# Patient Record
Sex: MALE | Employment: STUDENT | URBAN - METROPOLITAN AREA
[De-identification: names, ages, dates, MRNs, and addresses within clinical notes are randomized per-mention and may not be internally consistent; named-entity substitution may affect disease eponyms.]

---

## 2023-04-21 ENCOUNTER — TELEPHONE (OUTPATIENT)
Dept: PULMONOLOGY | Facility: CLINIC | Age: 28
End: 2023-04-21
Payer: COMMERCIAL

## 2023-04-21 NOTE — TELEPHONE ENCOUNTER
At the request of Mil's partner Debra's CF care team I contacted Debra to discuss her desire to pursue genetic counseling and CF carrier screening for Mil.  We briefly reviewed the process for this and a vitual appointment was scheduled at the couple's convenience.  The call center telephone number was shared for Mil to call to update his registration prior to the visit.  I remain available for additional questions or concerns in the meantime.       Nikky Das MS, [facility] [place]  Genetic Counselor  [facility]  [facility]

## 2023-04-26 ENCOUNTER — VIRTUAL VISIT (OUTPATIENT)
Dept: PULMONOLOGY | Facility: CLINIC | Age: 28
End: 2023-04-26
Attending: GENETIC COUNSELOR, MS
Payer: COMMERCIAL

## 2023-04-26 VITALS — HEIGHT: 71 IN | WEIGHT: 175 LBS | BODY MASS INDEX: 24.5 KG/M2

## 2023-04-26 DIAGNOSIS — Z83.49 FAMILY HISTORY OF CYSTIC FIBROSIS: ICD-10-CM

## 2023-04-26 DIAGNOSIS — Z13.71 SCREENING FOR GENETIC DISEASE CARRIER STATUS: ICD-10-CM

## 2023-04-26 DIAGNOSIS — Z82.79 FAMILY HISTORY OF CONGENITAL OR GENETIC CONDITION: Primary | ICD-10-CM

## 2023-04-26 PROCEDURE — 96040 HC GENETIC COUNSELING, EACH 30 MINUTES: CPT | Mod: GT,95 | Performed by: GENETIC COUNSELOR, MS

## 2023-04-26 ASSESSMENT — PAIN SCALES - GENERAL: PAINLEVEL: NO PAIN (0)

## 2023-04-26 NOTE — LETTER
4/26/2023      RE: Mil Llanes  [address]  [place] [address]     Dear Colleague,    Thank you for the opportunity to participate in the care of your patient, Mil Llanes, at the [facility] at [facility]. Please see a copy of my visit note below.    Virtual Visit Details    Type of service:  Video Visit     Originating Location (pt. Location): Home  Distant Location (provider location): Off-site  Platform used for Video Visit: Likelii    Video Visit Start Time: 1:52pm  Video Visit End Time:  2:14pm      Presenting Information:  Mil Llanes is a 27-year-old man whose partner Debra has a diagnosis of cystic fibrosis.  Mil was seen today for genetic counseling and carrier screening.  During today's visit a family history was collected, the genetics and inheritance of cystic fibrosis were reviewed, and the options available for carrier screening were discussed.  At the conclusion of our visit Mil opted to proceed with cystic fibrosis carrier screening only and informed consent was obtained.      Family History:  A detailed pedigree was obtained and scanned into the electronic medical record.  It is significant for the following:     Mil has no children yet by choice.      Mil has a brother and a sister, both of whom are healthy.      Mil's brother has two healthy daughters.      Mil's mother is 60-years-old and healthy.     Mil's father is 61-years-old.  He has a history of obesity.      Mil has no known family history of cystic fibrosis, lung disease, pancreatitis, or male infertility.  He additionally reports no history of individuals with developmental delay/intellectual disability, birth defects, other significant health problems, or known genetic conditions.      Mil's partner Debra is 26-years-old.  She has cystic fibrosis.      Mil is of Pashto, [place]ic, and  [place] ancestry.  His partner Debra is of Turkish and other mixed  ancestry.  Consanguinity was denied.      Discussion:  Today we reviewed that genes are long stretches of DNA that are responsible for how our bodies look and how our bodies work.  We all have two copies of every gene; one inherited from the mother and one inherited from the father. When there is a change, called a mutation, in a gene it can cause it to not do its job correctly which can cause the signs and symptoms of a genetic condition.  Cystic fibrosis (CF) is caused by mutations in a gene called CFTR.       Cystic fibrosis is inherited in an autosomal recessive pattern. This means that to be affected an individual must inherit a mutation in both copies of the CFTR gene (one from each parent). Individuals with just one mutation in the CFTR gene are said to be carriers. Carriers do not have cystic fibrosis but can have an affected child if their partner is also a carrier.      Because Mil's partner Debra has cystic fibrosis, she has a mutation on both copies of her CFTR gene.  Therefore no matter which copy of her CFTR gene she passes down to a child, a child will inherit a mutation and at least be a carrier for cystic fibrosis.  A child could actually have cystic fibrosis if Mil is a carrier.  If he is, any child will have a 50% chance to have cystic fibrosis.  If Mil is not found to be a carrier the chance to have a child with CF will be drastically reduced to approximately 1/8,800 but not zero.  Without any carrier screening, the current chance for the couple to have a child with CF is approximately 1/50 (2%).       We reviewed the options for carrier screening including for the CFTR gene only, or expanded carrier screening for a large panel of genes in addition to the CFTR gene.  Our conversation included the possibility of identifying a milder CFTR mutation as well as the possibility of finding two CFTR mutations.   After a detailed discussion about the potential costs, benefits, and limitations of the options Mil opted to proceed with CFTR carrier screening only.  Verbal informed consent was obtained.  This will be performed at [facility] who will contact Mil directly with an estimate of insurance benefits and option for self-pay billing.  Results are expected approximately 2-3 weeks after sample collection and I will contact Mil by telephone when results return.  Additional recommendations will be made at that time, including additional genetic counseling regarding reproductive options in the event Mil is found to be a CF carrier.      I appreciate the opportunity to be a part of Mil's care today.  He is encouraged to contact me with any additional questions or concerns.      Plan:  1.  Cystic fibrosis carrier screening, to be performed at [facility]  2.  A sample collection kit will be sent to Mil's home   3.  [facility] will contact Mil with an estimated out of pocket cost and option for self-pay  4.  I will contact Mil when results return, 2-3 weeks following sample collection  5.  Follow-up as determined by results of the above testing        Nikky Das [facility]  Genetic Counselor  Division of Genetics and Metabolism      Please do not hesitate to contact me if you have any questions/concerns.     Sincerely,       Nikky Das GC

## 2023-04-26 NOTE — PROGRESS NOTES
Virtual Visit Details    Type of service:  Video Visit     Originating Location (pt. Location): Home  Distant Location (provider location): Off-site  Platform used for Video Visit: Fishbowl    Video Visit Start Time: 1:52pm  Video Visit End Time:  2:14pm      Presenting Information:  Mil Llanes is a 27-year-old man whose partner Debra has a diagnosis of cystic fibrosis.  Mil was seen today for genetic counseling and carrier screening.  During today's visit a family history was collected, the genetics and inheritance of cystic fibrosis were reviewed, and the options available for carrier screening were discussed.  At the conclusion of our visit Mil opted to proceed with cystic fibrosis carrier screening only and informed consent was obtained.      Family History:  A detailed pedigree was obtained and scanned into the electronic medical record.  It is significant for the following:     Mil has no children yet by choice.      Mil has a brother and a sister, both of whom are healthy.      Mil's brother has two healthy daughters.      Mil's mother is 60-years-old and healthy.     Mil's father is 61-years-old.  He has a history of obesity.      Mil has no known family history of cystic fibrosis, lung disease, pancreatitis, or male infertility.  He additionally reports no history of individuals with developmental delay/intellectual disability, birth defects, other significant health problems, or known genetic conditions.      Mil's partner Debra is 26-years-old.  She has cystic fibrosis.      Mil is of Burkinan, [place], and [place]ammarinese ancestry.  His partner Debra is of Sammarinese and other mixed  ancestry.  Consanguinity was denied.      Discussion:  Today we reviewed that genes are long stretches of DNA that are responsible for how our bodies look and how our bodies work.  We all have two copies of every gene; one inherited from the mother and one inherited from the father.  When there is a change, called a mutation, in a gene it can cause it to not do its job correctly which can cause the signs and symptoms of a genetic condition.  Cystic fibrosis (CF) is caused by mutations in a gene called CFTR.       Cystic fibrosis is inherited in an autosomal recessive pattern. This means that to be affected an individual must inherit a mutation in both copies of the CFTR gene (one from each parent). Individuals with just one mutation in the CFTR gene are said to be carriers. Carriers do not have cystic fibrosis but can have an affected child if their partner is also a carrier.      Because Mil's partner Debra has cystic fibrosis, she has a mutation on both copies of her CFTR gene.  Therefore no matter which copy of her CFTR gene she passes down to a child, a child will inherit a mutation and at least be a carrier for cystic fibrosis.  A child could actually have cystic fibrosis if Mil is a carrier.  If he is, any child will have a 50% chance to have cystic fibrosis.  If Mil is not found to be a carrier the chance to have a child with CF will be drastically reduced to approximately 1/8,800 but not zero.  Without any carrier screening, the current chance for the couple to have a child with CF is approximately 1/50 (2%).       We reviewed the options for carrier screening including for the CFTR gene only, or expanded carrier screening for a large panel of genes in addition to the CFTR gene.  Our conversation included the possibility of identifying a milder CFTR mutation as well as the possibility of finding two CFTR mutations.  After a detailed discussion about the potential costs, benefits, and limitations of the options Mil opted to proceed with CFTR carrier screening only.  Verbal informed consent was obtained.  This will be performed at [facility] who will contact Mil directly with an estimate of insurance benefits and option for self-pay billing.  Results are  expected approximately 2-3 weeks after sample collection and I will contact Mil by telephone when results return.  Additional recommendations will be made at that time, including additional genetic counseling regarding reproductive options in the event Mil is found to be a CF carrier.      I appreciate the opportunity to be a part of Mil's care today.  He is encouraged to contact me with any additional questions or concerns.      Plan:  1.  Cystic fibrosis carrier screening, to be performed at [facility]  2.  A sample collection kit will be sent to Mil's home   3.  [facility] will contact Mil with an estimated out of pocket cost and option for self-pay  4.  I will contact Mil when results return, 2-3 weeks following sample collection  5.  Follow-up as determined by results of the above testing        Nikky Das MS [facility]  Genetic Counselor  Division of Genetics and Metabolism

## 2023-04-26 NOTE — NURSING NOTE
Is the patient currently in the state of [place]? YES    Visit mode:VIDEO    If the visit is dropped, the patient can be reconnected by: VIDEO VISIT: Send to e-mail at: [email or web address]    Will anyone else be joining the visit? NO      How would you like to obtain your AVS? Mail a copy    Are changes needed to the allergy or medication list? YES: Pt states that he has an allergy to horses, cats, and dogs.  Horses was severe as a 11 year old with hives.  Dogs and cats if he touches eyes will get itchy.  Pt doesn't have any med; therefore, didn't want to list a pharmacy.    Reason for visit: Video Visit    Date of pt reported vitals: estimation, probably a year a go  Pain:  no  Shannan Florez

## 2023-05-19 ENCOUNTER — TELEPHONE (OUTPATIENT)
Dept: PULMONOLOGY | Facility: CLINIC | Age: 28
End: 2023-05-19
Payer: COMMERCIAL

## 2023-05-19 NOTE — TELEPHONE ENCOUNTER
I contacted Mil to disclose and discuss the results of his recent carrier screening for cystic fibrosis (CF).  This was pursued due to his partner Debra having CF.  Mil's carrier screen has returned negative (normal) with no mutations identified in the CFTR gene.  This very significantly reduces the chance for the couple to have a child with CF.  Mil expressed understanding and had no additional questions at this time.  A results letter and copy of his lab report will be sent by mail.        Nikky Das MS, [facility] [place]  Genetic Counselor  [facility]  [facility]

## 2024-03-27 DIAGNOSIS — Z31.41 FERTILITY TESTING: Primary | ICD-10-CM
